# Patient Record
Sex: FEMALE | Race: WHITE | NOT HISPANIC OR LATINO | URBAN - NONMETROPOLITAN AREA
[De-identification: names, ages, dates, MRNs, and addresses within clinical notes are randomized per-mention and may not be internally consistent; named-entity substitution may affect disease eponyms.]

---

## 2023-10-27 ENCOUNTER — OFFICE (OUTPATIENT)
Dept: URBAN - NONMETROPOLITAN AREA CLINIC 1 | Facility: CLINIC | Age: 63
End: 2023-10-27

## 2023-10-27 VITALS
SYSTOLIC BLOOD PRESSURE: 128 MMHG | DIASTOLIC BLOOD PRESSURE: 67 MMHG | HEIGHT: 65 IN | WEIGHT: 109 LBS | HEART RATE: 74 BPM

## 2023-10-27 DIAGNOSIS — R14.0 ABDOMINAL DISTENSION (GASEOUS): ICD-10-CM

## 2023-10-27 DIAGNOSIS — Z86.010 PERSONAL HISTORY OF COLONIC POLYPS: ICD-10-CM

## 2023-10-27 DIAGNOSIS — K58.9 IRRITABLE BOWEL SYNDROME WITHOUT DIARRHEA: ICD-10-CM

## 2023-10-27 PROCEDURE — 99214 OFFICE O/P EST MOD 30 MIN: CPT | Performed by: NURSE PRACTITIONER

## 2023-10-27 RX ORDER — RIFAXIMIN 550 MG/1
TABLET ORAL
Qty: 42 | Refills: 2 | Status: ACTIVE
Start: 2023-10-27

## 2023-10-27 NOTE — SERVICENOTES
The risks for colonoscopy were discussed with her and she agrees to proceed.  
The bowel prep was prescribed and instructions were provided to her.  
Two refills were given on the Xifaxan

## 2023-10-27 NOTE — SERVICEHPINOTES
She says she is here for refills on her Flagyl.  She has been taking it as needed for several years.  She says it helps with IBS and bloating.  She had  gastric bypass surgery several years ago and has had chronic problems with bloating and abdominal discomfort.  She takes Flagyl a couple of times a week for it.
br I have declined her request,  as Flagyl is not indicated for her symptoms.  I have discussed with Dr. Velez.  I suggested we try a 2 week course of Xifaxan instead.
br
br She is also due for a colonoscopy, for colon polyp surveillance.  Her bowels are moving regularly and she has not seen blood with her stools.  No unexpected weight loss.br
br
br
br   GI HISTORYbr-December 27, 2007 colonoscopy: Mild sigmoid diverticulosisbr
br -Colonoscopy by Dr. worrell 07/08/2020br1 centimeter broad-based ileocecal valve polyp removed with snare with cautery and retrieved. Three millimeter broad-based rectosigmoid colon polyp removed with cold snare technique and retrieved. Otherwise remainder of the colon appeared normal. brFinal Pathologic Diagnosis:1. ILEOCECAL VALVE, ENDOSCOPIC BIOPSY: Fragments of sessile serrated adenoma. 2. colon, rectosigmoid, endoscopic BIOPSY: Tubular adenoma with focal serrated morphologic features. br -Repeat colonoscopy in 3 years.br
br -Abdominal ultrasound 08/16/2021:FINDINGS:brThere is homogeneous mild increased echogenicity of the liver without brfocal mass or biliary tract obstruction. The abdominal aorta and brinferior vena cava appear unremarkable. The pancreas appears brunremarkable. The gallbladder has been removed. The kidneys are brnormal in size and appearance. There is no renal mass or brobstruction. The spleen is normal in size and appearance. There is brno free intraperitoneal fluid. Common bile duct measures. The brcommon bile duct measures 4 mmbr visited="true"

## 2025-08-13 ENCOUNTER — OFFICE (OUTPATIENT)
Dept: URBAN - NONMETROPOLITAN AREA CLINIC 1 | Facility: CLINIC | Age: 65
End: 2025-08-13
Payer: COMMERCIAL

## 2025-08-13 VITALS
DIASTOLIC BLOOD PRESSURE: 64 MMHG | HEIGHT: 65 IN | WEIGHT: 116 LBS | HEART RATE: 99 BPM | SYSTOLIC BLOOD PRESSURE: 101 MMHG

## 2025-08-13 DIAGNOSIS — K58.9 IRRITABLE BOWEL SYNDROME, UNSPECIFIED: ICD-10-CM

## 2025-08-13 DIAGNOSIS — Z86.0101 PERSONAL HISTORY OF ADENOMATOUS AND SERRATED COLON POLYPS: ICD-10-CM

## 2025-08-13 DIAGNOSIS — F34.1 DYSTHYMIC DISORDER: ICD-10-CM

## 2025-08-13 DIAGNOSIS — Z09 ENCOUNTER FOR FOLLOW-UP EXAMINATION AFTER COMPLETED TREATMEN: ICD-10-CM

## 2025-08-13 PROCEDURE — 99214 OFFICE O/P EST MOD 30 MIN: CPT | Performed by: NURSE PRACTITIONER

## 2025-08-13 RX ORDER — RIFAXIMIN 550 MG/1
TABLET ORAL
Qty: 42 | Refills: 1 | Status: ACTIVE
Start: 2025-08-13